# Patient Record
Sex: FEMALE | Race: WHITE | NOT HISPANIC OR LATINO | Employment: UNEMPLOYED | URBAN - METROPOLITAN AREA
[De-identification: names, ages, dates, MRNs, and addresses within clinical notes are randomized per-mention and may not be internally consistent; named-entity substitution may affect disease eponyms.]

---

## 2017-06-26 ENCOUNTER — OFFICE VISIT (OUTPATIENT)
Dept: FAMILY MEDICINE CLINIC | Facility: CLINIC | Age: 37
End: 2017-06-26

## 2017-06-26 VITALS
OXYGEN SATURATION: 98 % | HEIGHT: 70 IN | TEMPERATURE: 98.4 F | HEART RATE: 80 BPM | SYSTOLIC BLOOD PRESSURE: 124 MMHG | DIASTOLIC BLOOD PRESSURE: 80 MMHG | WEIGHT: 198.4 LBS | BODY MASS INDEX: 28.4 KG/M2

## 2017-06-26 DIAGNOSIS — M79.7 FIBROMYALGIA: Primary | ICD-10-CM

## 2017-06-26 PROCEDURE — 99213 OFFICE O/P EST LOW 20 MIN: CPT | Performed by: FAMILY MEDICINE

## 2017-06-26 RX ORDER — VENLAFAXINE HYDROCHLORIDE 75 MG/1
75 CAPSULE, EXTENDED RELEASE ORAL DAILY
Qty: 30 CAPSULE | Refills: 5 | Status: SHIPPED | OUTPATIENT
Start: 2017-06-26 | End: 2017-09-29 | Stop reason: SDUPTHER

## 2017-06-26 RX ORDER — MAGNESIUM GLUCONATE 27 MG(500)
500 TABLET ORAL DAILY
COMMUNITY

## 2017-06-26 RX ORDER — VENLAFAXINE HYDROCHLORIDE 37.5 MG/1
37.5 CAPSULE, EXTENDED RELEASE ORAL DAILY
Qty: 14 CAPSULE | Refills: 0 | Status: SHIPPED | OUTPATIENT
Start: 2017-06-26 | End: 2017-07-24

## 2017-06-26 RX ORDER — AMOXICILLIN 250 MG/1
CAPSULE ORAL
Refills: 0 | COMMUNITY
Start: 2017-06-21 | End: 2017-07-24

## 2017-07-24 ENCOUNTER — OFFICE VISIT (OUTPATIENT)
Dept: FAMILY MEDICINE CLINIC | Facility: CLINIC | Age: 37
End: 2017-07-24

## 2017-07-24 VITALS
BODY MASS INDEX: 27.47 KG/M2 | TEMPERATURE: 98.7 F | WEIGHT: 191.9 LBS | OXYGEN SATURATION: 96 % | HEIGHT: 70 IN | DIASTOLIC BLOOD PRESSURE: 90 MMHG | HEART RATE: 97 BPM | SYSTOLIC BLOOD PRESSURE: 126 MMHG

## 2017-07-24 DIAGNOSIS — M79.7 FIBROMYALGIA: Primary | ICD-10-CM

## 2017-07-24 PROCEDURE — 99213 OFFICE O/P EST LOW 20 MIN: CPT | Performed by: FAMILY MEDICINE

## 2017-07-24 NOTE — PROGRESS NOTES
Subjective   Fidelia Avendaño is a 37 y.o. female who is here for   Chief Complaint   Patient presents with   • Anxiety   • Depression   .     History of Present Illness   Fibromyalgia: Patient here for follow up on fibromyalgia. Patient has a several years history of fatigue: moderate: course over time: gradually improving.  depression: moderate: course over time: symptoms have progressed to a point and plateaued..  Onset was gradual. The symptoms are of moderate severity. They are made worse by: nothing. They are helped by rest. The patient denies diagnosis is of longstanding, no ROS questions asked. Previous treatments include prescription meds - see below. Associated symptoms include none. Patient denies associated rashes/photosensitive. Evaluation to date includes diagnosis is of longstanding, no recent workup.. Recent interventions include adding effexor.  Limitation on activities include difficulty with ADLs - some. Patient a SAHM.  Overall happy with effexors effects  Less fatigue        The following portions of the patient's history were reviewed and updated as appropriate: allergies, current medications, past family history, past medical history, past social history, past surgical history and problem list.    Review of Systems    Objective   Physical Exam   Constitutional: She appears well-developed and well-nourished.   Cardiovascular: Normal rate.    Pulmonary/Chest: Effort normal.   Musculoskeletal: Normal range of motion.   Neurological: She is alert.   Psychiatric: She has a normal mood and affect.   Nursing note and vitals reviewed.      Assessment/Plan   Fidelia was seen today for anxiety and depression.    Diagnoses and all orders for this visit:    Fibromyalgia      Patient Instructions   Stay on Effexor 75      Medications Discontinued During This Encounter   Medication Reason   • amoxicillin (AMOXIL) 250 MG capsule    • venlafaxine XR (EFFEXOR XR) 37.5 MG 24 hr capsule         Return for Next  scheduled follow up.    Dr. Antonio Ann  [place], [place]

## 2017-09-20 DIAGNOSIS — Z00.00 ROUTINE ADULT HEALTH MAINTENANCE: Primary | ICD-10-CM

## 2017-09-21 LAB
ALBUMIN SERPL-MCNC: 4.6 G/DL (ref 3.5–5.2)
ALBUMIN/GLOB SERPL: 1.9 G/DL
ALP SERPL-CCNC: 63 U/L (ref 40–129)
ALT SERPL-CCNC: 63 U/L (ref 5–33)
APPEARANCE UR: CLEAR
AST SERPL-CCNC: 39 U/L (ref 5–32)
BACTERIA #/AREA URNS HPF: ABNORMAL /HPF
BILIRUB SERPL-MCNC: 0.7 MG/DL (ref 0.2–1.2)
BILIRUB UR QL STRIP: NEGATIVE
BUN SERPL-MCNC: 12 MG/DL (ref 6–20)
BUN/CREAT SERPL: 12.8 (ref 7–25)
CALCIUM SERPL-MCNC: 9.6 MG/DL (ref 8.6–10.5)
CHLORIDE SERPL-SCNC: 102 MMOL/L (ref 98–107)
CHOLEST SERPL-MCNC: 132 MG/DL (ref 0–200)
CO2 SERPL-SCNC: 28.6 MMOL/L (ref 22–29)
COLOR UR: YELLOW
CREAT SERPL-MCNC: 0.94 MG/DL (ref 0.57–1)
EPI CELLS #/AREA URNS HPF: ABNORMAL /HPF
ERYTHROCYTE [DISTWIDTH] IN BLOOD BY AUTOMATED COUNT: 12.7 % (ref 11.5–14.5)
GLOBULIN SER CALC-MCNC: 2.4 GM/DL
GLUCOSE SERPL-MCNC: 90 MG/DL (ref 65–99)
GLUCOSE UR QL: NEGATIVE
HCT VFR BLD AUTO: 42.8 % (ref 37–47)
HDLC SERPL-MCNC: 65 MG/DL (ref 40–60)
HGB BLD-MCNC: 14.3 G/DL (ref 12–16)
HGB UR QL STRIP: NEGATIVE
KETONES UR QL STRIP: NEGATIVE
LDLC SERPL CALC-MCNC: 54 MG/DL (ref 0–100)
LDLC/HDLC SERPL: 0.83 {RATIO}
LEUKOCYTE ESTERASE UR QL STRIP: ABNORMAL
MCH RBC QN AUTO: 31.5 PG (ref 27–31)
MCHC RBC AUTO-ENTMCNC: 33.4 G/DL (ref 31–37)
MCV RBC AUTO: 94.3 FL (ref 81–99)
NITRITE UR QL STRIP: NEGATIVE
PH UR STRIP: 5.5 [PH] (ref 4.5–8)
PLATELET # BLD AUTO: 311 10*3/MM3 (ref 140–500)
POTASSIUM SERPL-SCNC: 4.7 MMOL/L (ref 3.5–5.2)
PROT SERPL-MCNC: 7 G/DL (ref 6–8.5)
PROT UR QL STRIP: NEGATIVE
RBC # BLD AUTO: 4.54 10*6/MM3 (ref 4.2–5.4)
RBC #/AREA URNS HPF: ABNORMAL /HPF
SODIUM SERPL-SCNC: 141 MMOL/L (ref 136–145)
SP GR UR: 1.02 (ref 1–1.03)
TRIGL SERPL-MCNC: 64 MG/DL (ref 0–150)
UROBILINOGEN UR STRIP-MCNC: ABNORMAL MG/DL
VLDLC SERPL CALC-MCNC: 12.8 MG/DL (ref 7–27)
WBC # BLD AUTO: 5.4 10*3/MM3 (ref 4.8–10.8)
WBC #/AREA URNS HPF: ABNORMAL /HPF

## 2017-09-29 ENCOUNTER — OFFICE VISIT (OUTPATIENT)
Dept: FAMILY MEDICINE CLINIC | Facility: CLINIC | Age: 37
End: 2017-09-29

## 2017-09-29 VITALS
DIASTOLIC BLOOD PRESSURE: 88 MMHG | HEART RATE: 77 BPM | BODY MASS INDEX: 27.14 KG/M2 | HEIGHT: 70 IN | SYSTOLIC BLOOD PRESSURE: 120 MMHG | TEMPERATURE: 98 F | OXYGEN SATURATION: 98 % | WEIGHT: 189.6 LBS

## 2017-09-29 DIAGNOSIS — Z00.00 ROUTINE ADULT HEALTH MAINTENANCE: Primary | ICD-10-CM

## 2017-09-29 DIAGNOSIS — M79.7 FIBROMYALGIA: ICD-10-CM

## 2017-09-29 DIAGNOSIS — F33.0 MILD EPISODE OF RECURRENT MAJOR DEPRESSIVE DISORDER (HCC): ICD-10-CM

## 2017-09-29 DIAGNOSIS — R74.8 ELEVATED LIVER ENZYMES: ICD-10-CM

## 2017-09-29 PROCEDURE — 99395 PREV VISIT EST AGE 18-39: CPT | Performed by: FAMILY MEDICINE

## 2017-09-29 RX ORDER — VENLAFAXINE HYDROCHLORIDE 150 MG/1
150 CAPSULE, EXTENDED RELEASE ORAL DAILY
Qty: 30 CAPSULE | Refills: 5 | Status: SHIPPED | OUTPATIENT
Start: 2017-09-29 | End: 2018-04-04 | Stop reason: SDUPTHER

## 2017-09-29 NOTE — PROGRESS NOTES
"  Chief Complaint   Patient presents with   • Annual Exam   • Elevated Hepatic Enzymes       Subjective   Fidelia Avendaño is a 37 y.o. female and is here for a yearly physical exam. The patient reports problems - uncontrolled fibromyalagia, and new onset eleveated LFT's.    fibromyalgia is not much better, in bed most of the day  No SE from the 75 of Effexor, so lets double the dose.    Do you take any herbs or supplements that were not prescribed by a doctor? yes. If so, these will be added to active medication list.    The following portions of the patient's history were reviewed and updated as appropriate: allergies, current medications, past family history, past medical history, past social history, past surgical history and problem list.    Social and Family and Surgical History reviewed and updated today, see Rooming tab.    Health History, Preventive Measures and Vaccination flow sheets reviewed and updated today.    Patient's current medical chart in Epic; including previous office notes, imaging, labs, specialist's evaluation either in notes or in Media tab reviewed today.    Other pertinent medical information also reviewed thru Care Everywhere function is also reviewed today.    Review of Systems  Review of Systems  A comprehensive review of systems was negative except for: Constitutional: positive for fatigue and malaise  Behavioral/Psych: positive for depression and mood swings    Vitals:    09/29/17 0759   BP: 120/88   BP Location: Left arm   Patient Position: Sitting   Pulse: 77   Temp: 98 °F (36.7 °C)   SpO2: 98%   Weight: 189 lb 9.6 oz (86 kg)   Height: 70.25\" (178.4 cm)       General Appearance:  Alert, cooperative, no distress, appears stated age   Head:  Normocephalic, without obvious abnormality, atraumatic   Eyes:  PERRL, conjunctiva/corneas clear, EOM's intact.   Ears:  Normal TM's and external ear canals, both ears   Nose: Nares normal, septum midline, mucosa normal, no drainage or sinus " tenderness   Throat: Lips, mucosa, and tongue normal; teeth and gums normal   Neck: Supple, symmetrical, trachea midline, no adenopathy;   thyroid: No enlargement/tenderness/nodules; no carotid  bruit   Back:  Symmetric, no curvature, ROM normal, no CVA tenderness   Lungs:  Clear to auscultation bilaterally, respirations unlabored   Chest wall:  No tenderness or deformity   Heart:  Regular rate and rhythm, S1 and S2 normal, no murmur, rub or gallop   Abdomen:  Soft, non-tender, bowel sounds active all four quadrants,   no masses, no organomegaly   Rectal:        Extremities: Extremities normal, atraumatic, no cyanosis or edema   Pulses: 2+ and symmetric all extremities   Skin: Skin color, texture, turgor normal, no rashes or lesions   Lymph nodes: Cervical, supraclavicular, and axillary nodes normal   Neurologic: CNII-XII intact. Normal strength, sensation and reflexes   throughout          Results for orders placed or performed in visit on 09/20/17   Comprehensive metabolic panel   Result Value Ref Range    Glucose 90 65 - 99 mg/dL    BUN 12 6 - 20 mg/dL    Creatinine 0.94 0.57 - 1.00 mg/dL    eGFR Non African Am 67 >60 mL/min/1.73    eGFR African Am 81 >60 mL/min/1.73    BUN/Creatinine Ratio 12.8 7.0 - 25.0    Sodium 141 136 - 145 mmol/L    Potassium 4.7 3.5 - 5.2 mmol/L    Chloride 102 98 - 107 mmol/L    Total CO2 28.6 22.0 - 29.0 mmol/L    Calcium 9.6 8.6 - 10.5 mg/dL    Total Protein 7.0 6.0 - 8.5 g/dL    Albumin 4.60 3.50 - 5.20 g/dL    Globulin 2.4 gm/dL    A/G Ratio 1.9 g/dL    Total Bilirubin 0.7 0.2 - 1.2 mg/dL    Alkaline Phosphatase 63 40 - 129 U/L    AST (SGOT) 39 (H) 5 - 32 U/L    ALT (SGPT) 63 (H) 5 - 33 U/L   CBC (No Diff)   Result Value Ref Range    WBC 5.40 4.80 - 10.80 10*3/mm3    RBC 4.54 4.20 - 5.40 10*6/mm3    Hemoglobin 14.3 12.0 - 16.0 g/dL    Hematocrit 42.8 37.0 - 47.0 %    MCV 94.3 81.0 - 99.0 fL    MCH 31.5 (H) 27.0 - 31.0 pg    MCHC 33.4 31.0 - 37.0 g/dL    RDW 12.7 11.5 - 14.5 %     Platelets 311 140 - 500 10*3/mm3   Urinalysis With / Microscopic If Indicated   Result Value Ref Range    Specific Gravity, UA 1.020 1.003 - 1.030    pH, UA 5.5 4.5 - 8.0    Color, UA Yellow     Appearance, UA Clear Clear    Leukocytes, UA Trace (A) Negative    Protein Negative Negative    Glucose, UA Negative Negative    Ketones Negative     Blood, UA Negative Negative    Bilirubin, UA Negative Negative    Urobilinogen, UA Comment     Nitrite, UA Negative Negative   Lipid Panel With LDL / HDL Ratio   Result Value Ref Range    Total Cholesterol 132 0 - 200 mg/dL    Triglycerides 64 0 - 150 mg/dL    HDL Cholesterol 65 (H) 40 - 60 mg/dL    VLDL Cholesterol 12.8 7 - 27 mg/dL    LDL Cholesterol  54 0 - 100 mg/dL    LDL/HDL Ratio 0.83    Microscopic Examination   Result Value Ref Range    WBC, UA 6-12 (A) None Seen /hpf    RBC, UA 3-5 (A) None Seen /hpf    Epithelial Cells (non renal) 3-6 (A) /hpf    Bacteria, UA 1+ (A) None Seen /hpf     Assessment/Plan   Healthy female exam.  Fidelia was seen today for annual exam and elevated hepatic enzymes.    Diagnoses and all orders for this visit:    Routine adult health maintenance    Mild episode of recurrent major depressive disorder    Fibromyalgia  -     venlafaxine XR (EFFEXOR-XR) 150 MG 24 hr capsule; Take 1 capsule by mouth Daily.  -     Hepatic Function Panel  -     Amylase  -     Lipase  -     AYO With / DsDNA, RNP, Sjogrens A / B, Casillas; Future  -     Iron and TIBC  -     Ferritin  -     Cancel: Hepatic Function Panel; Future  -     US Liver; Future    Elevated liver enzymes  -     TSH  -     T4, Free      1. Will work up worsening elevated LFT's.  Working diagnosis is fatty liver.    2. Patient Counseling:  --Nutrition: Stressed importance of moderation in sodium/caffeine intake, saturated fat and cholesterol.  Discussed caloric balance, sufficient intake of fresh fruits, vegetables, fiber, calcium, iron.ok  --  Exercise: Stressed the importance   --of regular  exercise.   --Substance Abuse: Discussed cessation/primary prevention of tobacco, alcohol, or other drug use; driving or other dangerous activities under the influence. None here   --Dental health: Discussed importance of regular tooth brushing, flossing, and dental visits.  -- suggested having eyes and vision checked if needed or past due.      3. Discussed the patient's BMI with her.  The BMI is in the acceptable range  4. Follow up in 2 weeks    There are no Patient Instructions on file for this visit.    Medications Discontinued During This Encounter   Medication Reason   • venlafaxine XR (EFFEXOR XR) 75 MG 24 hr capsule Reorder        Dr. Antonio Ann MD  [place], [place]  [facility]

## 2017-10-02 LAB
ALBUMIN SERPL-MCNC: 4.6 G/DL (ref 3.5–5.2)
ALP SERPL-CCNC: 66 U/L (ref 40–129)
ALT SERPL-CCNC: 59 U/L (ref 5–33)
AMYLASE SERPL-CCNC: 35 U/L (ref 28–100)
ANA SER QL: NEGATIVE
AST SERPL-CCNC: 32 U/L (ref 5–32)
BILIRUB DIRECT SERPL-MCNC: <0.2 MG/DL (ref 0.2–0.3)
BILIRUB SERPL-MCNC: 0.4 MG/DL (ref 0.2–1.2)
FERRITIN SERPL-MCNC: 121.7 NG/ML (ref 13–150)
IRON SATN MFR SERPL: 28 %
IRON SERPL-MCNC: 82 MCG/DL (ref 37–145)
LIPASE SERPL-CCNC: 29 U/L (ref 13–60)
PROT SERPL-MCNC: 6.9 G/DL (ref 6–8.5)
T4 FREE SERPL-MCNC: 0.96 NG/DL (ref 0.93–1.7)
TIBC SERPL-MCNC: 298 MCG/DL (ref 261–478)
TSH SERPL DL<=0.005 MIU/L-ACNC: 2.85 MIU/ML (ref 0.27–4.2)
UIBC SERPL-MCNC: 216 MCG/DL (ref 112–346)

## 2017-10-05 ENCOUNTER — HOSPITAL ENCOUNTER (OUTPATIENT)
Dept: ULTRASOUND IMAGING | Facility: HOSPITAL | Age: 37
Discharge: HOME | End: 2017-10-05
Attending: FAMILY MEDICINE | Admitting: FAMILY MEDICINE

## 2017-10-05 DIAGNOSIS — M79.7 FIBROMYALGIA: ICD-10-CM

## 2017-10-05 PROCEDURE — 76705 ECHO EXAM OF ABDOMEN: CPT

## 2017-10-14 DIAGNOSIS — F32.A DEPRESSION: ICD-10-CM

## 2017-10-16 RX ORDER — ESCITALOPRAM OXALATE 10 MG/1
TABLET ORAL
Qty: 90 TABLET | Refills: 0 | Status: SHIPPED | OUTPATIENT
Start: 2017-10-16 | End: 2017-10-17 | Stop reason: SDUPTHER

## 2017-10-17 ENCOUNTER — OFFICE VISIT (OUTPATIENT)
Dept: FAMILY MEDICINE CLINIC | Facility: CLINIC | Age: 37
End: 2017-10-17

## 2017-10-17 VITALS
HEIGHT: 70 IN | OXYGEN SATURATION: 95 % | DIASTOLIC BLOOD PRESSURE: 80 MMHG | BODY MASS INDEX: 26.77 KG/M2 | WEIGHT: 187 LBS | HEART RATE: 97 BPM | SYSTOLIC BLOOD PRESSURE: 110 MMHG

## 2017-10-17 DIAGNOSIS — F33.1 MODERATE EPISODE OF RECURRENT MAJOR DEPRESSIVE DISORDER (HCC): ICD-10-CM

## 2017-10-17 DIAGNOSIS — R74.8 ELEVATED LIVER ENZYMES: Primary | ICD-10-CM

## 2017-10-17 PROCEDURE — 99213 OFFICE O/P EST LOW 20 MIN: CPT | Performed by: FAMILY MEDICINE

## 2017-10-17 RX ORDER — ESCITALOPRAM OXALATE 20 MG/1
20 TABLET ORAL EVERY MORNING
Qty: 30 TABLET | Refills: 5 | Status: SHIPPED | OUTPATIENT
Start: 2017-10-17 | End: 2018-06-16 | Stop reason: SDUPTHER

## 2017-10-17 NOTE — PATIENT INSTRUCTIONS
Results for orders placed or performed in visit on 09/29/17   Hepatic Function Panel   Result Value Ref Range    Total Protein 6.9 6.0 - 8.5 g/dL    Albumin 4.60 3.50 - 5.20 g/dL    Total Bilirubin 0.4 0.2 - 1.2 mg/dL    Bilirubin, Direct <0.2 (L) 0.2 - 0.3 mg/dL    Alkaline Phosphatase 66 40 - 129 U/L    AST (SGOT) 32 5 - 32 U/L    ALT (SGPT) 59 (H) 5 - 33 U/L   Amylase   Result Value Ref Range    Amylase 35 28 - 100 U/L   Lipase   Result Value Ref Range    Lipase 29 13 - 60 U/L   Iron and TIBC   Result Value Ref Range    TIBC 298 261 - 478 mcg/dL    UIBC 216 112 - 346 mcg/dL    Iron 82 37 - 145 mcg/dL    Iron Saturation 28 %   Ferritin   Result Value Ref Range    Ferritin 121.70 13.00 - 150.00 ng/mL   T4, Free   Result Value Ref Range    Free T4 0.96 0.93 - 1.70 ng/dL   TSH   Result Value Ref Range    TSH 2.850 0.270 - 4.200 mIU/mL   AYO With / DsDNA, RNP, Sjogrens A / B, Casillas   Result Value Ref Range    AYO Direct Negative Negative     Liver ultrasound      INDICATION: Worsening Abnormal elevated liver enzymes. Prior history of  cholecystectomy.      FINDINGS: Sonographic evaluation is performed of the right upper  quadrant in multiple planes. No comparison. Pancreas is normal. Liver  parenchyma is echogenic compatible with fatty infiltration. No focal  liver mass is seen. Right kidney is morphologically normal and  nonobstructed. Common duct is normal at less than 2 mm internal  diameter. Gallbladder surgically absent. No free fluid.      IMPRESSION:  Hepatic steatosis and cholecystectomy. Otherwise negative.      This report was finalized on 10/5/2017 9:21 AM by Dr. Rick Aragon MD.        Fatty liver mangagement is weight loss  Will recheck LFT's in a year.    Lets double lexapro for depression

## 2017-10-17 NOTE — PROGRESS NOTES
Subjective   Fidelia Avendaño is a 37 y.o. female who is here for   Chief Complaint   Patient presents with   • Follow-up     lab results    • Elevated Hepatic Enzymes   .     History of Present Illness   Reviewed liver tests for mildly elevated LFT's  Fatty liver is seen.    Still with lots of am fatigue.    Having sharp pains in right shoulder.  Previous small tears yrs ago, without preceding injury.    The following portions of the patient's history were reviewed and updated as appropriate: allergies, current medications, past family history, past medical history, past social history, past surgical history and problem list.    Review of Systems    Objective   Physical Exam   Constitutional: She appears well-developed and well-nourished.   Pulmonary/Chest: Effort normal.   Abdominal: There is no tenderness.   Musculoskeletal:        Right shoulder: Normal.   Skin: No rash noted.   Nursing note and vitals reviewed.      Assessment/Plan   Fidelia was seen today for follow-up and elevated hepatic enzymes.    Diagnoses and all orders for this visit:    Elevated liver enzymes    Moderate episode of recurrent major depressive disorder  -     escitalopram (LEXAPRO) 20 MG tablet; Take 1 tablet by mouth Every Morning.      Patient Instructions     Results for orders placed or performed in visit on 09/29/17   Hepatic Function Panel   Result Value Ref Range    Total Protein 6.9 6.0 - 8.5 g/dL    Albumin 4.60 3.50 - 5.20 g/dL    Total Bilirubin 0.4 0.2 - 1.2 mg/dL    Bilirubin, Direct <0.2 (L) 0.2 - 0.3 mg/dL    Alkaline Phosphatase 66 40 - 129 U/L    AST (SGOT) 32 5 - 32 U/L    ALT (SGPT) 59 (H) 5 - 33 U/L   Amylase   Result Value Ref Range    Amylase 35 28 - 100 U/L   Lipase   Result Value Ref Range    Lipase 29 13 - 60 U/L   Iron and TIBC   Result Value Ref Range    TIBC 298 261 - 478 mcg/dL    UIBC 216 112 - 346 mcg/dL    Iron 82 37 - 145 mcg/dL    Iron Saturation 28 %   Ferritin   Result Value Ref Range    Ferritin 121.70  13.00 - 150.00 ng/mL   T4, Free   Result Value Ref Range    Free T4 0.96 0.93 - 1.70 ng/dL   TSH   Result Value Ref Range    TSH 2.850 0.270 - 4.200 mIU/mL   AYO With / DsDNA, RNP, Sjogrens A / B, Casillas   Result Value Ref Range    AYO Direct Negative Negative     Liver ultrasound      INDICATION: Worsening Abnormal elevated liver enzymes. Prior history of  cholecystectomy.      FINDINGS: Sonographic evaluation is performed of the right upper  quadrant in multiple planes. No comparison. Pancreas is normal. Liver  parenchyma is echogenic compatible with fatty infiltration. No focal  liver mass is seen. Right kidney is morphologically normal and  nonobstructed. Common duct is normal at less than 2 mm internal  diameter. Gallbladder surgically absent. No free fluid.      IMPRESSION:  Hepatic steatosis and cholecystectomy. Otherwise negative.      This report was finalized on 10/5/2017 9:21 AM by Dr. Rick Aragon MD.        Fatty liver mangagement is weight loss  Will recheck LFT's in a year.    Lets double lexapro for depression      Medications Discontinued During This Encounter   Medication Reason   • escitalopram (LEXAPRO) 10 MG tablet Reorder        Return in about 1 year (around 10/17/2018).    Dr. Antonio Ann  [place], [place]

## 2018-04-04 DIAGNOSIS — M79.7 FIBROMYALGIA: ICD-10-CM

## 2018-04-04 RX ORDER — VENLAFAXINE HYDROCHLORIDE 150 MG/1
150 CAPSULE, EXTENDED RELEASE ORAL DAILY
Qty: 30 CAPSULE | Refills: 5 | Status: SHIPPED | OUTPATIENT
Start: 2018-04-04 | End: 2018-06-05

## 2018-04-18 ENCOUNTER — OFFICE VISIT (OUTPATIENT)
Dept: FAMILY MEDICINE CLINIC | Facility: CLINIC | Age: 38
End: 2018-04-18

## 2018-04-18 VITALS
TEMPERATURE: 98 F | SYSTOLIC BLOOD PRESSURE: 118 MMHG | OXYGEN SATURATION: 98 % | BODY MASS INDEX: 25.91 KG/M2 | DIASTOLIC BLOOD PRESSURE: 80 MMHG | HEART RATE: 89 BPM | HEIGHT: 70 IN | WEIGHT: 181 LBS

## 2018-04-18 DIAGNOSIS — M79.7 FIBROMYALGIA: Primary | ICD-10-CM

## 2018-04-18 PROCEDURE — 99213 OFFICE O/P EST LOW 20 MIN: CPT | Performed by: FAMILY MEDICINE

## 2018-04-18 NOTE — PROGRESS NOTES
Subjective   Fidelia Avendaño is a 37 y.o. female who is here for   Chief Complaint   Patient presents with   • Follow-up   • Fibromyalgia   • Fatigue   • Depression   • Headache   .     History of Present Illness   Fibromyalgia: Patient here for follow up on fibromyalgia. Patient has a 10 years history of soft tissue pain: b arm, back and b leg. severity: fairly severe, overall course: symptoms have progressed to a point and plateaued: course over time: symptoms have progressed to a point and plateaued.  fatigue: moderate: course over time: symptoms have progressed to a point and plateaued.  depression: moderate: course over time: symptoms have progressed to a point and plateaued.  headaches: moderate, tolerable: course over time: symptoms have progressed to a point and plateaued..  Onset was gradual. The symptoms are of moderate severity. They are made worse by: menstrual periods, overhead work and raising arm over head. They are helped by nothing. The patient denies diagnosis is of longstanding, no ROS questions asked. Previous treatments include lexapro, effexor, savella. Associated symptoms include fatigue, joint pain, muscle weakness and new headache. Patient denies associated bleeding/clotting problems, nodules, oral ulcers and rashes/photosensitive. Evaluation to date includes diagnosis is of longstanding, no recent workup.. Recent interventions include no recent changes in regimen..  Limitation on activities include difficulty with ADLs - x. Patient struggles as a SAHM.    Effexor has helped some  We discussed Savella , was on med in past , worked well for years then stopped  Willing to re try.      The following portions of the patient's history were reviewed and updated as appropriate: allergies, current medications, past family history, past medical history, past social history, past surgical history and problem list.    Review of Systems    Objective   Physical Exam   Constitutional: She appears  well-developed and well-nourished.   Cardiovascular: Normal rate.    Pulmonary/Chest: Effort normal.   Neurological: She is alert.   Psychiatric: She exhibits a depressed mood.   Nursing note and vitals reviewed.      Assessment/Plan   Fidelia was seen today for follow-up, fibromyalgia, fatigue, depression and headache.    Diagnoses and all orders for this visit:    Fibromyalgia  -     milnacipran (SAVELLA) 50 MG tablet tablet; Take 1 tablet by mouth 2 (Two) Times a Day.      Patient Instructions   Once we get the PA on Savella, then stop effexor        There are no discontinued medications.     Return in about 6 weeks (around 5/30/2018) for new medication follow up.    Dr. Antonio Ann  [place], [place]

## 2018-06-05 ENCOUNTER — OFFICE VISIT (OUTPATIENT)
Dept: FAMILY MEDICINE CLINIC | Facility: CLINIC | Age: 38
End: 2018-06-05

## 2018-06-05 VITALS
SYSTOLIC BLOOD PRESSURE: 120 MMHG | BODY MASS INDEX: 26.34 KG/M2 | OXYGEN SATURATION: 97 % | WEIGHT: 184 LBS | HEART RATE: 91 BPM | HEIGHT: 70 IN | DIASTOLIC BLOOD PRESSURE: 84 MMHG

## 2018-06-05 DIAGNOSIS — M79.7 FIBROMYALGIA: Primary | ICD-10-CM

## 2018-06-05 PROCEDURE — 99213 OFFICE O/P EST LOW 20 MIN: CPT | Performed by: FAMILY MEDICINE

## 2018-06-05 RX ORDER — IBUPROFEN 200 MG
200 TABLET ORAL EVERY 6 HOURS PRN
COMMUNITY

## 2018-06-05 RX ORDER — ACETAMINOPHEN 500 MG
500 TABLET ORAL EVERY 6 HOURS PRN
COMMUNITY

## 2018-06-05 NOTE — PROGRESS NOTES
Subjective   Fidelia Avendaño is a 38 y.o. female who is here for   Chief Complaint   Patient presents with   • Follow-up   • Depression   • Fibromyalgia   .     History of Present Illness   fibromyalgia may be some better  But only taking 1 Savella a day by mistake  Should be on bid  She can not read the labels.        The following portions of the patient's history were reviewed and updated as appropriate: allergies, current medications, past family history, past medical history, past social history, past surgical history and problem list.    Review of Systems   Constitutional: Positive for fatigue.   Cardiovascular: Positive for palpitations.   Musculoskeletal: Positive for myalgias and neck stiffness. Negative for joint swelling.   Skin: Negative for rash.       Objective   Physical Exam   Constitutional: She appears well-developed and well-nourished.   Cardiovascular: Normal rate.    Neurological: She is alert.   Nursing note and vitals reviewed.      Assessment/Plan   Fidelia was seen today for follow-up, depression and fibromyalgia.    Diagnoses and all orders for this visit:    Fibromyalgia      Patient Instructions   Increase Savella to BID        Medications Discontinued During This Encounter   Medication Reason   • venlafaxine XR (EFFEXOR-XR) 150 MG 24 hr capsule *Therapy completed        No Follow-up on file.    Dr. Antonio Ann  [facility]  [place], [place]

## 2018-06-16 DIAGNOSIS — F33.1 MODERATE EPISODE OF RECURRENT MAJOR DEPRESSIVE DISORDER (HCC): ICD-10-CM

## 2018-06-18 RX ORDER — ESCITALOPRAM OXALATE 20 MG/1
20 TABLET ORAL EVERY MORNING
Qty: 30 TABLET | Refills: 5 | Status: SHIPPED | OUTPATIENT
Start: 2018-06-18 | End: 2018-11-09 | Stop reason: SDUPTHER

## 2018-07-26 ENCOUNTER — TELEPHONE (OUTPATIENT)
Dept: FAMILY MEDICINE CLINIC | Facility: CLINIC | Age: 38
End: 2018-07-26

## 2018-07-26 DIAGNOSIS — M79.7 FIBROMYALGIA: Primary | ICD-10-CM

## 2018-07-26 NOTE — TELEPHONE ENCOUNTER
Fidelia said that she had spoke to you regarding increasing her Savella. She wants to know if you can call this in to [facility].    Ok , i sent in new higher dose of 100 mg bid  You are on the 50 bid now  Try the 50 mg in am and 100 at bed for 7 days to get used to new dose  Then day 8 , take the full 100 bid.    Antonio Ann MD

## 2018-08-07 ENCOUNTER — OFFICE VISIT (OUTPATIENT)
Dept: FAMILY MEDICINE CLINIC | Facility: CLINIC | Age: 38
End: 2018-08-07

## 2018-08-07 VITALS
DIASTOLIC BLOOD PRESSURE: 90 MMHG | BODY MASS INDEX: 27.06 KG/M2 | OXYGEN SATURATION: 98 % | SYSTOLIC BLOOD PRESSURE: 128 MMHG | HEIGHT: 70 IN | WEIGHT: 189 LBS | HEART RATE: 94 BPM

## 2018-08-07 DIAGNOSIS — M79.7 FIBROMYALGIA: Primary | ICD-10-CM

## 2018-08-07 PROCEDURE — 99213 OFFICE O/P EST LOW 20 MIN: CPT | Performed by: FAMILY MEDICINE

## 2018-11-09 DIAGNOSIS — F33.1 MODERATE EPISODE OF RECURRENT MAJOR DEPRESSIVE DISORDER (HCC): ICD-10-CM

## 2018-11-09 DIAGNOSIS — M79.7 FIBROMYALGIA: ICD-10-CM

## 2018-11-09 RX ORDER — ESCITALOPRAM OXALATE 20 MG/1
20 TABLET ORAL EVERY MORNING
Qty: 90 TABLET | Refills: 0 | Status: SHIPPED | OUTPATIENT
Start: 2018-11-09 | End: 2019-02-11 | Stop reason: SDUPTHER

## 2019-02-11 DIAGNOSIS — F33.1 MODERATE EPISODE OF RECURRENT MAJOR DEPRESSIVE DISORDER (HCC): ICD-10-CM

## 2019-02-11 DIAGNOSIS — M79.7 FIBROMYALGIA: ICD-10-CM

## 2019-02-12 RX ORDER — MILNACIPRAN HYDROCHLORIDE 100 MG/1
TABLET, FILM COATED ORAL
Qty: 180 TABLET | Refills: 0 | Status: SHIPPED | OUTPATIENT
Start: 2019-02-12 | End: 2019-05-02 | Stop reason: SDUPTHER

## 2019-02-12 RX ORDER — ESCITALOPRAM OXALATE 20 MG/1
TABLET ORAL
Qty: 90 TABLET | Refills: 0 | Status: SHIPPED | OUTPATIENT
Start: 2019-02-12 | End: 2019-05-02 | Stop reason: SDUPTHER

## 2019-02-18 ENCOUNTER — OFFICE VISIT (OUTPATIENT)
Dept: FAMILY MEDICINE CLINIC | Facility: CLINIC | Age: 39
End: 2019-02-18

## 2019-02-18 VITALS
WEIGHT: 194 LBS | BODY MASS INDEX: 27.77 KG/M2 | SYSTOLIC BLOOD PRESSURE: 130 MMHG | HEIGHT: 70 IN | DIASTOLIC BLOOD PRESSURE: 84 MMHG | OXYGEN SATURATION: 99 % | HEART RATE: 114 BPM

## 2019-02-18 DIAGNOSIS — M79.7 FIBROMYALGIA: Primary | ICD-10-CM

## 2019-02-18 PROCEDURE — 99213 OFFICE O/P EST LOW 20 MIN: CPT | Performed by: FAMILY MEDICINE

## 2019-02-18 NOTE — PROGRESS NOTES
Subjective   Fidelia Avendaño is a 38 y.o. female who is here for   Chief Complaint   Patient presents with   • Follow-up   • Fibromyalgia   .     History of Present Illness   Fibromyalgia: Patient here for follow up on fibromyalgia. Patient has a several year history of soft tissue pain: bilateral arm, back, head and neck. severity: moderate, tolerable: course over time: symptoms have progressed to a point and plateaued.  fatigue: fairly severe: course over time: symptoms have progressed to a point and plateaued.  headaches: tolerable: course over time: symptoms have progressed to a point and plateaued..  Onset was gradual. The symptoms are of moderate and generalized severity. They are made worse by: menstrual periods, movement, overhead work and overuse. They are helped by savella. The patient denies diagnosis is of longstanding, no ROS questions asked. Previous treatments include Massage Therapy. Associated symptoms include arthralgia, depression, fatigue, joint pain, morning stiffness and muscle weakness. Patient denies associated bleeding/clotting problems, new headache and oral ulcers. Evaluation to date includes diagnosis is of longstanding, no recent workup.. Recent interventions include no recent changes in regimen..  Limitation on activities include difficulty with ADLs - ,. Patient is on disability.  On max lexparo and max Savella  Headaches are some better  Having some fewer fatigue days      The following portions of the patient's history were reviewed and updated as appropriate: allergies, current medications, past family history, past medical history, past social history, past surgical history and problem list.    Review of Systems    Objective   Physical Exam   Constitutional: She appears well-developed and well-nourished.   Cardiovascular: Normal rate.   Pulmonary/Chest: Effort normal.   Musculoskeletal: She exhibits tenderness.   Nursing note and vitals reviewed.      Assessment/Plan   Fidelia proctor  seen today for follow-up and fibromyalgia.    Diagnoses and all orders for this visit:    Fibromyalgia  -     Ambulatory Referral to Physical Therapy Evaluate and treat; (dry needling/trigger point injections)      There are no Patient Instructions on file for this visit.    There are no discontinued medications.     Return in about 3 months (around 5/18/2019).    Dr. Antonio Ann  [place], [place]

## 2019-03-04 NOTE — PROGRESS NOTES
Physical Therapy Initial Evaluation and Plan of Care    Patient: Fidelia Avendaño   : 1980  Diagnosis/ICD-10 Code:  Fibromyalgia [M79.7]  Referring practitioner: Antonio Ann MD    Subjective Evaluation    History of Present Illness  Mechanism of injury: Pt has suffered with fibromyalgia throughout body and her MD suggested dry needling.  Pt reports that she tried massage therapy in the past and that she had a significant increase in pain over the next 4 days.  Her MD suggested that she just start with one body area and she would like to have her low back treated today.       PMH of Raynaud's, is legally blind and     Pain  Current pain ratin  At worst pain ratin  Location: LLBP and hips   Quality: discomfort and dull ache  Relieving factors: medications (Savella)    Treatments  Previous treatment: massage  Patient Goals  Patient goals for therapy: decreased pain             Objective       Palpation   Left   Tenderness of the erector spinae, gluteus maico, lumbar paraspinals and piriformis.     Right Tenderness of the gluteus maico and piriformis.          Assessment/Plan  Informed and signed consent for Dry Needling obtained. Patient educated in purpose, risk factors and procedure. Due to poor response to massage therapy in the past, fewer needles were used.  Pt reported mild soreness following treatment.  Requested patient to apply moist heat again tonight.      Cont PT 1x per week or as indicated by patient for Dry Needling.  Pt may benefit from additional needles as tolerated depending upon response.      Manual Therapy:         mins  74152;  Therapeutic Exercise:         mins  20839;     Neuromuscular Pelon:        mins  25606;    Therapeutic Activity:          mins  68905;     Gait Training:           mins  39027;     Ultrasound:          mins  96070;    Electrical Stimulation:         mins  97801 ( );  Iontophoresis                      mins 44000  Dry Needling                  15      mins self-pay      Timed Treatment:      mins   Total Treatment:     30   mins    PT SIGNATURE: Shaila Maradiaga, PT   [place] License # [id number]  DATE TREATMENT INITIATED: 3/6/2019    Initial Certification  Certification Period: 6/4/2019  I certify that the therapy services are furnished while this patient is under my care.  The services outlined above are required by this patient, and will be reviewed every 90 days.     PHYSICIAN: Antonio Ann MD      DATE:     Please sign and return via fax to [phone].. Thank you, [facility].

## 2019-03-06 ENCOUNTER — TREATMENT (OUTPATIENT)
Dept: PHYSICAL THERAPY | Facility: CLINIC | Age: 39
End: 2019-03-06

## 2019-03-06 DIAGNOSIS — M79.7 FIBROMYALGIA: Primary | ICD-10-CM

## 2019-03-06 PROCEDURE — DRYNDL PR CUSTOM DRY NEEDLING SELF PAY: Performed by: PHYSICAL THERAPIST

## 2019-03-21 ENCOUNTER — TREATMENT (OUTPATIENT)
Dept: PHYSICAL THERAPY | Facility: CLINIC | Age: 39
End: 2019-03-21

## 2019-03-21 DIAGNOSIS — M54.2 PAIN, NECK: ICD-10-CM

## 2019-03-21 DIAGNOSIS — M79.7 FIBROMYALGIA: Primary | ICD-10-CM

## 2019-03-21 PROCEDURE — DRYNDL PR CUSTOM DRY NEEDLING SELF PAY: Performed by: PHYSICAL THERAPIST

## 2019-03-21 NOTE — PROGRESS NOTES
Physical Therapy Daily Progress Note        Visit # : 2  Fidelia Avendaño reports: I have fibromyalgia and my neck has really been hurting me with increased headaches    Subjective     Objective       Palpation   Left   Hypertonic in the cervical interspinals, cervical paraspinals, levator scapulae, middle trapezius, sternocleidomastoid, suboccipitals and upper trapezius.     Right   Hypertonic in the cervical interspinals, cervical paraspinals, levator scapulae, middle trapezius, sternocleidomastoid, suboccipitals and upper trapezius.      See Exercise, Manual, and Modality Logs for complete treatment.       Assessment & Plan     Assessment  Assessment details: Patient presents with guarded cervical mobility and increased bilateral cervical muscle tone. Improved subjective reports and mobility after Dry Needling. Educated patient to apply moist heat again tonight. Cont PT 1x per week as indicated for Dry Needling.         Progress per Plan of Care           Manual Therapy:         mins  15511;  Therapeutic Exercise:         mins  15987;     Neuromuscular Pelon:       mins  06997;    Therapeutic Activity:          mins  44067;     Gait Training:           mins  79091;     Ultrasound:          mins  11647;    Electrical Stimulation:         mins  31322 ( );  Dry Needling     15     mins self-pay    Timed Treatment:      mins   Total Treatment:     25   mins    Madeleine Lopez, PT  Physical Therapist  [place] License # [id number]

## 2019-04-04 ENCOUNTER — TREATMENT (OUTPATIENT)
Dept: PHYSICAL THERAPY | Facility: CLINIC | Age: 39
End: 2019-04-04

## 2019-04-04 DIAGNOSIS — M79.7 FIBROMYALGIA: Primary | ICD-10-CM

## 2019-04-04 DIAGNOSIS — G89.29 CHRONIC BILATERAL LOW BACK PAIN WITHOUT SCIATICA: ICD-10-CM

## 2019-04-04 DIAGNOSIS — M54.50 CHRONIC BILATERAL LOW BACK PAIN WITHOUT SCIATICA: ICD-10-CM

## 2019-04-04 PROCEDURE — DRYNDL PR CUSTOM DRY NEEDLING SELF PAY: Performed by: PHYSICAL THERAPIST

## 2019-05-02 DIAGNOSIS — M79.7 FIBROMYALGIA: ICD-10-CM

## 2019-05-02 DIAGNOSIS — F33.1 MODERATE EPISODE OF RECURRENT MAJOR DEPRESSIVE DISORDER (HCC): ICD-10-CM

## 2019-05-02 RX ORDER — ESCITALOPRAM OXALATE 20 MG/1
TABLET ORAL
Qty: 90 TABLET | Refills: 0 | Status: SHIPPED | OUTPATIENT
Start: 2019-05-02 | End: 2019-07-18 | Stop reason: SDUPTHER

## 2019-05-02 RX ORDER — MILNACIPRAN HYDROCHLORIDE 100 MG/1
TABLET, FILM COATED ORAL
Qty: 180 TABLET | Refills: 0 | Status: SHIPPED | OUTPATIENT
Start: 2019-05-02 | End: 2019-07-18 | Stop reason: SDUPTHER

## 2019-06-03 ENCOUNTER — OFFICE VISIT (OUTPATIENT)
Dept: FAMILY MEDICINE CLINIC | Facility: CLINIC | Age: 39
End: 2019-06-03

## 2019-06-03 VITALS
OXYGEN SATURATION: 99 % | SYSTOLIC BLOOD PRESSURE: 144 MMHG | HEART RATE: 98 BPM | WEIGHT: 197.1 LBS | BODY MASS INDEX: 28.22 KG/M2 | DIASTOLIC BLOOD PRESSURE: 104 MMHG | HEIGHT: 70 IN

## 2019-06-03 DIAGNOSIS — M79.7 FIBROMYALGIA: Primary | ICD-10-CM

## 2019-06-03 PROCEDURE — 99213 OFFICE O/P EST LOW 20 MIN: CPT | Performed by: FAMILY MEDICINE

## 2019-06-03 NOTE — PROGRESS NOTES
Subjective   Fidelia Avendaño is a 39 y.o. female who is here for   Chief Complaint   Patient presents with   • Fibromyalgia   .     History of Present Illness   fibromyalgia is some better  Dry needling at PT helped for 24h per session  Mood is better      The following portions of the patient's history were reviewed and updated as appropriate: allergies, current medications, past family history, past medical history, past social history, past surgical history and problem list.    Review of Systems    Objective   Physical Exam   Constitutional: She appears well-developed and well-nourished.   Cardiovascular: Normal rate.   Neurological: She is alert.   Nursing note and vitals reviewed.      Assessment/Plan   Fidelia was seen today for fibromyalgia.    Diagnoses and all orders for this visit:    Fibromyalgia    stay on Savella and Lexparo  Ok to stop PT  There are no Patient Instructions on file for this visit.    There are no discontinued medications.     Return in about 4 months (around 10/3/2019).    Dr. Antonio Ann  [place], [place].

## 2019-07-18 DIAGNOSIS — M79.7 FIBROMYALGIA: ICD-10-CM

## 2019-07-18 DIAGNOSIS — F33.1 MODERATE EPISODE OF RECURRENT MAJOR DEPRESSIVE DISORDER (HCC): ICD-10-CM

## 2019-07-18 RX ORDER — ESCITALOPRAM OXALATE 20 MG/1
TABLET ORAL
Qty: 90 TABLET | Refills: 0 | Status: SHIPPED | OUTPATIENT
Start: 2019-07-18 | End: 2019-10-02 | Stop reason: SDUPTHER

## 2019-07-18 RX ORDER — MILNACIPRAN HYDROCHLORIDE 100 MG/1
TABLET, FILM COATED ORAL
Qty: 180 TABLET | Refills: 0 | Status: SHIPPED | OUTPATIENT
Start: 2019-07-18 | End: 2019-10-02 | Stop reason: SDUPTHER

## 2019-09-16 ENCOUNTER — TELEPHONE (OUTPATIENT)
Dept: FAMILY MEDICINE CLINIC | Facility: CLINIC | Age: 39
End: 2019-09-16

## 2019-09-16 DIAGNOSIS — F45.8: Primary | ICD-10-CM

## 2019-09-16 NOTE — TELEPHONE ENCOUNTER
BRIAN WITH [facility] [place], [address] , [address], [place]  [address]    PT WAS REFERRED BY [facility]OM[facility] ZAYNAB COTO     FOR STARGARTS SYNDROME    HAS AN APPT 9-19-19    NEEDS A  REFERRAL     (PT HAS NOT CALLED TO REQUEST REFERRAL)     Ok referral made    Antonio Ann MD

## 2019-10-02 DIAGNOSIS — F33.1 MODERATE EPISODE OF RECURRENT MAJOR DEPRESSIVE DISORDER (HCC): ICD-10-CM

## 2019-10-02 DIAGNOSIS — M79.7 FIBROMYALGIA: ICD-10-CM

## 2019-10-03 RX ORDER — ESCITALOPRAM OXALATE 20 MG/1
TABLET ORAL
Qty: 90 TABLET | Refills: 0 | Status: SHIPPED | OUTPATIENT
Start: 2019-10-03 | End: 2020-01-14

## 2019-10-03 RX ORDER — MILNACIPRAN HYDROCHLORIDE 100 MG/1
TABLET, FILM COATED ORAL
Qty: 180 TABLET | Refills: 0 | Status: SHIPPED | OUTPATIENT
Start: 2019-10-03 | End: 2020-01-14

## 2019-10-14 ENCOUNTER — OFFICE VISIT (OUTPATIENT)
Dept: FAMILY MEDICINE CLINIC | Facility: CLINIC | Age: 39
End: 2019-10-14

## 2019-10-14 VITALS
SYSTOLIC BLOOD PRESSURE: 128 MMHG | BODY MASS INDEX: 28.06 KG/M2 | HEIGHT: 70 IN | DIASTOLIC BLOOD PRESSURE: 86 MMHG | HEART RATE: 96 BPM | WEIGHT: 196 LBS | OXYGEN SATURATION: 97 %

## 2019-10-14 DIAGNOSIS — M79.7 FIBROMYALGIA: Primary | ICD-10-CM

## 2019-10-14 PROCEDURE — 99213 OFFICE O/P EST LOW 20 MIN: CPT | Performed by: FAMILY MEDICINE

## 2019-10-14 RX ORDER — CYCLOBENZAPRINE HCL 10 MG
10 TABLET ORAL NIGHTLY PRN
Qty: 30 TABLET | Refills: 0 | Status: SHIPPED | OUTPATIENT
Start: 2019-10-14 | End: 2019-11-30 | Stop reason: SDUPTHER

## 2019-10-14 NOTE — PROGRESS NOTES
Subjective   Fidelia Avendaño is a 39 y.o. female who is here for   Chief Complaint   Patient presents with   • Fatigue     exhausted all the time   • Depression     saddness    .     History of Present Illness   Depression is worse  Fatigue is worse  Body aches are worse  Sleep is worse    Flexeril has helped in past for pain and sleep.    Headaches are ok      The following portions of the patient's history were reviewed and updated as appropriate: allergies, current medications, past family history, past medical history, past social history, past surgical history and problem list.    Review of Systems    Objective   Physical Exam   Constitutional: She appears well-developed and well-nourished.   Cardiovascular: Normal rate.   Pulmonary/Chest: Effort normal.   Psychiatric: She has a normal mood and affect.   Nursing note and vitals reviewed.      Assessment/Plan   Fidelia was seen today for fatigue and depression.    Diagnoses and all orders for this visit:    Fibromyalgia  -     cyclobenzaprine (FLEXERIL) 10 MG tablet; Take 1 tablet by mouth At Night As Needed for Muscle Spasms.    will try flexeril first  May try ritalin next for chronic fatigue  Or maybe Trintillex?    There are no Patient Instructions on file for this visit.    Medications Discontinued During This Encounter   Medication Reason   • Multiple Vitamins-Minerals (MULTIVITAL PO) *Therapy completed        Return in about 3 weeks (around 11/4/2019) for new medication follow up.    Dr. Antonio Ann  [place], [place].

## 2019-10-30 ENCOUNTER — OFFICE VISIT (OUTPATIENT)
Dept: FAMILY MEDICINE CLINIC | Facility: CLINIC | Age: 39
End: 2019-10-30

## 2019-10-30 VITALS
DIASTOLIC BLOOD PRESSURE: 82 MMHG | WEIGHT: 192 LBS | SYSTOLIC BLOOD PRESSURE: 126 MMHG | HEART RATE: 99 BPM | HEIGHT: 70 IN | BODY MASS INDEX: 27.49 KG/M2 | OXYGEN SATURATION: 98 %

## 2019-10-30 DIAGNOSIS — R53.82 CHRONIC FATIGUE: Primary | ICD-10-CM

## 2019-10-30 PROCEDURE — 99213 OFFICE O/P EST LOW 20 MIN: CPT | Performed by: FAMILY MEDICINE

## 2019-10-30 RX ORDER — METHYLPHENIDATE HYDROCHLORIDE 10 MG/1
10 TABLET ORAL 2 TIMES DAILY
Qty: 60 TABLET | Refills: 0 | Status: SHIPPED | OUTPATIENT
Start: 2019-10-30 | End: 2019-11-27 | Stop reason: SDUPTHER

## 2019-10-30 NOTE — PROGRESS NOTES
Subjective   Fidelia Avendaño is a 39 y.o. female who is here for   Chief Complaint   Patient presents with   • Fibromyalgia     f/u    .     History of Present Illness   Flexeril helped with night time pain and sleep    But still feels like sleeping and staying in bed most of the time  The simplest activity in the house wears her out.      The following portions of the patient's history were reviewed and updated as appropriate: allergies, current medications, past family history, past medical history, past social history, past surgical history and problem list.    Review of Systems    Objective   Physical Exam   Constitutional: She appears well-developed and well-nourished.   Cardiovascular: Normal rate.   Pulmonary/Chest: Effort normal.   Neurological: She is alert.   Psychiatric: She exhibits a depressed mood.   Nursing note and vitals reviewed.      Assessment/Plan   Fidelia was seen today for fibromyalgia.    Diagnoses and all orders for this visit:    Chronic fatigue  -     methylphenidate (RITALIN) 10 MG tablet; Take 1 tablet by mouth 2 (Two) Times a Day.    I also filled out a lengthy   form for her    There are no Patient Instructions on file for this visit.    There are no discontinued medications.     Return in about 1 month (around 11/30/2019) for new medication follow up.    Dr. Antonio Ann  [facility] [place], [place].

## 2019-11-27 ENCOUNTER — OFFICE VISIT (OUTPATIENT)
Dept: FAMILY MEDICINE CLINIC | Facility: CLINIC | Age: 39
End: 2019-11-27

## 2019-11-27 VITALS
WEIGHT: 193 LBS | SYSTOLIC BLOOD PRESSURE: 128 MMHG | BODY MASS INDEX: 27.63 KG/M2 | DIASTOLIC BLOOD PRESSURE: 82 MMHG | OXYGEN SATURATION: 98 % | HEART RATE: 96 BPM | HEIGHT: 70 IN

## 2019-11-27 DIAGNOSIS — M79.7 FIBROMYALGIA: ICD-10-CM

## 2019-11-27 DIAGNOSIS — R53.82 CHRONIC FATIGUE: Primary | ICD-10-CM

## 2019-11-27 PROCEDURE — 99213 OFFICE O/P EST LOW 20 MIN: CPT | Performed by: FAMILY MEDICINE

## 2019-11-27 RX ORDER — METHYLPHENIDATE HYDROCHLORIDE 10 MG/1
10 TABLET ORAL 2 TIMES DAILY
Qty: 60 TABLET | Refills: 0 | Status: SHIPPED | OUTPATIENT
Start: 2019-11-27 | End: 2020-01-28 | Stop reason: SDUPTHER

## 2019-11-27 RX ORDER — METHYLPHENIDATE HYDROCHLORIDE 10 MG/1
10 TABLET ORAL 2 TIMES DAILY
Qty: 60 TABLET | Refills: 0 | Status: SHIPPED | OUTPATIENT
Start: 2019-11-27 | End: 2019-11-27 | Stop reason: SDUPTHER

## 2019-11-27 NOTE — PROGRESS NOTES
Subjective   Fidelia Avendaño is a 39 y.o. female who is here for   Chief Complaint   Patient presents with   • Fatigue     ritalin f/u    .     History of Present Illness   Feels much better with the ritalin in the am  Is awake  Able to cook breakfast for kids  Can do house chores.  Fatigue is better    The following portions of the patient's history were reviewed and updated as appropriate: allergies, current medications, past family history, past medical history, past social history, past surgical history and problem list.    Review of Systems    Objective   Physical Exam   Constitutional: She appears well-developed and well-nourished.   Cardiovascular: Normal rate.   Psychiatric: She has a normal mood and affect.   Nursing note and vitals reviewed.      Assessment/Plan   Fidelia was seen today for fatigue.    Diagnoses and all orders for this visit:    Chronic fatigue  -     Discontinue: methylphenidate (RITALIN) 10 MG tablet; Take 1 tablet by mouth 2 (Two) Times a Day.  -     methylphenidate (RITALIN) 10 MG tablet; Take 1 tablet by mouth 2 (Two) Times a Day.    Fibromyalgia    i printed 2 ritalin    Patient has been prescribed controlled medications.  Treatment discussed  MIKAYLA updated and reviewed.  Risk and Benefits discussed.  Per [place].      There are no Patient Instructions on file for this visit.    Medications Discontinued During This Encounter   Medication Reason   • Aspirin-Acetaminophen-Caffeine (EXCEDRIN PO) *Therapy completed   • methylphenidate (RITALIN) 10 MG tablet Reorder   • methylphenidate (RITALIN) 10 MG tablet Reorder        Return in about 2 months (around 1/27/2020) for new medication follow up.    Dr. Antonio Ann  [place], [place]

## 2019-11-30 DIAGNOSIS — M79.7 FIBROMYALGIA: ICD-10-CM

## 2019-12-02 RX ORDER — CYCLOBENZAPRINE HCL 10 MG
10 TABLET ORAL NIGHTLY PRN
Qty: 30 TABLET | Refills: 0 | Status: SHIPPED | OUTPATIENT
Start: 2019-12-02 | End: 2020-01-28 | Stop reason: SDUPTHER

## 2019-12-19 NOTE — PROGRESS NOTES
Physical Therapy Daily Progress Note        Visit # : 3  Fidelia Avendaño reports: I think my back is hurting worse today     Subjective     Objective       Palpation   Left   Hypertonic in the gluteus maico, lumbar interspinals, lumbar paraspinals and piriformis.     Right   Hypertonic in the gluteus maico, lumbar interspinals, lumbar paraspinals and piriformis.      See Exercise, Manual, and Modality Logs for complete treatment.       Assessment & Plan     Assessment  Assessment details: Patient presents with increased subjective reports and muscle tightness in bilateral low back today. Tolerated Dry Needling to bilateral low back. Improved muscle tone and mobility afterwards. Cont PT 1x per week or as indicated by patient.         Progress per Plan of Care           Manual Therapy:         mins  78644;  Therapeutic Exercise:         mins  13251;     Neuromuscular Pelon:       mins  52182;    Therapeutic Activity:          mins  46544;     Gait Training:           mins  85386;     Ultrasound:          mins  92530;    Electrical Stimulation:         mins  11491 ( );  Dry Needling     15     mins self-pay    Timed Treatment:      mins   Total Treatment:     25   mins    Madeleine Lopez, PT  Physical Therapist  [place] License # [id number]   without sedation

## 2019-12-24 ENCOUNTER — OFFICE VISIT (OUTPATIENT)
Dept: FAMILY MEDICINE CLINIC | Facility: CLINIC | Age: 39
End: 2019-12-24

## 2019-12-24 VITALS
BODY MASS INDEX: 27.77 KG/M2 | HEART RATE: 85 BPM | SYSTOLIC BLOOD PRESSURE: 118 MMHG | OXYGEN SATURATION: 99 % | DIASTOLIC BLOOD PRESSURE: 78 MMHG | HEIGHT: 70 IN | WEIGHT: 194 LBS

## 2019-12-24 DIAGNOSIS — S99.929A INJURY OF NAIL BED OF TOE: Primary | ICD-10-CM

## 2019-12-24 PROCEDURE — 11750 EXCISION NAIL&NAIL MATRIX: CPT | Performed by: FAMILY MEDICINE

## 2019-12-24 NOTE — PROGRESS NOTES
Subjective   Fidelia Avendaño is a 39 y.o. female who is here for   Chief Complaint   Patient presents with   • Toe Pain     rt foot big toe- bruised in september, puss came out from underneath toenail last night   .     History of Present Illness   Jammed her right great toe nail in her shoe months ago  More tender this week  Blood and pus came out from under the loose nail    The following portions of the patient's history were reviewed and updated as appropriate: allergies, current medications, past family history, past medical history, past social history, past surgical history and problem list.    Review of Systems    Objective     Physical Exam          Nail Removal  Date/Time: 12/24/2019 10:37 AM  Performed by: Antonio Ann MD  Authorized by: Antonio Ann MD   Consent: Verbal consent obtained.  Risks and benefits: risks, benefits and alternatives were discussed  Consent given by: patient  Patient understanding: patient states understanding of the procedure being performed  Patient identity confirmed: verbally with patient  Anesthesia: local infiltration    Anesthesia:  Local Anesthetic: lidocaine 1% without epinephrine  Anesthetic total: 2 mL  Preparation: skin prepped with alcohol  Amount removed: 1/2  Side: lateral  Wedge excision of skin of nail fold: no  Nail bed sutured: no  Nail matrix removed: partial  Removed nail replaced and anchored: no  Dressing: antibiotic ointment and gauze roll  Patient tolerance: Patient tolerated the procedure well with no immediate complications  Comments: Lifted nail plate off  Had a blood filled blister on nail bed, graves with scalpel point  Cut off loose tissue. Some oozing blood  All swabbed with iodine swab        Assessment/Plan   Fidelia was seen today for toe pain.    Diagnoses and all orders for this visit:    Injury of nail bed of toe    Other orders  -     Nail Removal      There are no Patient Instructions on file for this visit.    There are no  discontinued medications.     No follow-ups on file.    Dr. Antonio Ann  [place], [place]

## 2019-12-30 ENCOUNTER — HOSPITAL ENCOUNTER (OUTPATIENT)
Dept: GENERAL RADIOLOGY | Facility: HOSPITAL | Age: 39
Discharge: HOME | End: 2019-12-30
Admitting: FAMILY MEDICINE

## 2019-12-30 ENCOUNTER — TELEPHONE (OUTPATIENT)
Dept: FAMILY MEDICINE CLINIC | Facility: CLINIC | Age: 39
End: 2019-12-30

## 2019-12-30 DIAGNOSIS — S99.929A INJURY OF NAIL BED OF TOE: Primary | ICD-10-CM

## 2019-12-30 DIAGNOSIS — S99.929A INJURY OF NAIL BED OF TOE: ICD-10-CM

## 2019-12-30 PROCEDURE — 73660 X-RAY EXAM OF TOE(S): CPT

## 2019-12-30 RX ORDER — CEPHALEXIN 500 MG/1
500 CAPSULE ORAL 3 TIMES DAILY
Qty: 21 CAPSULE | Refills: 0 | Status: SHIPPED | OUTPATIENT
Start: 2019-12-30 | End: 2020-01-28

## 2019-12-30 NOTE — TELEPHONE ENCOUNTER
Pt called she said her toe is still swollen and now her foot is as well. She has been soaking it in epsom salt, anything else she can do or ntbs again?    I sent in keflex antibiotics for possible infection  Lets also xray the toe to make sure the bones look ok to rule out a occult fracture.    Antonio Ann MD

## 2020-01-14 DIAGNOSIS — F33.1 MODERATE EPISODE OF RECURRENT MAJOR DEPRESSIVE DISORDER (HCC): ICD-10-CM

## 2020-01-14 DIAGNOSIS — M79.7 FIBROMYALGIA: ICD-10-CM

## 2020-01-14 RX ORDER — MILNACIPRAN HYDROCHLORIDE 100 MG/1
TABLET, FILM COATED ORAL
Qty: 180 TABLET | Refills: 0 | Status: SHIPPED | OUTPATIENT
Start: 2020-01-14 | End: 2020-04-01

## 2020-01-14 RX ORDER — ESCITALOPRAM OXALATE 20 MG/1
TABLET ORAL
Qty: 90 TABLET | Refills: 0 | Status: SHIPPED | OUTPATIENT
Start: 2020-01-14 | End: 2020-04-01

## 2020-01-28 ENCOUNTER — OFFICE VISIT (OUTPATIENT)
Dept: FAMILY MEDICINE CLINIC | Facility: CLINIC | Age: 40
End: 2020-01-28

## 2020-01-28 VITALS
HEART RATE: 103 BPM | OXYGEN SATURATION: 99 % | WEIGHT: 198 LBS | HEIGHT: 71 IN | DIASTOLIC BLOOD PRESSURE: 80 MMHG | SYSTOLIC BLOOD PRESSURE: 130 MMHG | BODY MASS INDEX: 27.72 KG/M2

## 2020-01-28 DIAGNOSIS — R53.82 CHRONIC FATIGUE: Primary | ICD-10-CM

## 2020-01-28 DIAGNOSIS — M79.7 FIBROMYALGIA: ICD-10-CM

## 2020-01-28 PROBLEM — S99.929A INJURY OF NAIL BED OF TOE: Status: RESOLVED | Noted: 2019-12-24 | Resolved: 2020-01-28

## 2020-01-28 PROCEDURE — 99213 OFFICE O/P EST LOW 20 MIN: CPT | Performed by: FAMILY MEDICINE

## 2020-01-28 RX ORDER — METHYLPHENIDATE HYDROCHLORIDE 10 MG/1
10 TABLET ORAL 2 TIMES DAILY
Qty: 60 TABLET | Refills: 0 | Status: SHIPPED | OUTPATIENT
Start: 2020-01-28 | End: 2020-01-28 | Stop reason: SDUPTHER

## 2020-01-28 RX ORDER — METHYLPHENIDATE HYDROCHLORIDE 10 MG/1
10 TABLET ORAL 2 TIMES DAILY
Qty: 60 TABLET | Refills: 0 | Status: SHIPPED | OUTPATIENT
Start: 2020-01-28 | End: 2020-04-23 | Stop reason: SDUPTHER

## 2020-01-28 RX ORDER — CYCLOBENZAPRINE HCL 10 MG
10 TABLET ORAL NIGHTLY PRN
Qty: 30 TABLET | Refills: 5 | Status: SHIPPED | OUTPATIENT
Start: 2020-01-28 | End: 2020-05-13 | Stop reason: SDUPTHER

## 2020-01-28 NOTE — PROGRESS NOTES
Subjective   Fidelia Avendaño is a 39 y.o. female who is here for   Chief Complaint   Patient presents with   • Fatigue   .     History of Present Illness   Fatigue: Patient complains of fatigue. Symptoms began several years ago. Sentinal symptom the patient feels fatigue began with: exercise intolerance. Symptoms of her fatigue have been decreased libido, diffuse soft tissue aches and pains, fatigue with paradoxical insomnia, feelings of depression, general malaise, headaches, hypersomnolence, lack of interest in usual activities and poor athletic performance. Patient describes the following psychologic symptoms: none.  Patient denies fever, significant change in weight, unusual rashes, cold intolerance, constipation and change in hair texture., GI blood loss, excessive menstrual bleeding and witnessed or suspected sleep apnea. Symptoms have progressed to a point and plateaued. Severity has been struggles to carry out day to day responsibilities.. Previous visits for this problem: multiple, this is a longstanding diagnosis. Last seen 2 months ago by me   Ritalin provides some energy improvement  generalized prn tingling in hands and fingers  .       The following portions of the patient's history were reviewed and updated as appropriate: allergies, current medications, past family history, past medical history, past social history, past surgical history and problem list.    Review of Systems    Objective   Physical Exam   Constitutional: She appears well-developed and well-nourished. No distress.   Cardiovascular: Normal rate.   Pulmonary/Chest: Effort normal.   Neurological: She is alert.   Psychiatric: She has a normal mood and affect.   Nursing note and vitals reviewed.      Assessment/Plan   Fidelia was seen today for fatigue.    Diagnoses and all orders for this visit:    Chronic fatigue  -     Discontinue: methylphenidate (RITALIN) 10 MG tablet; Take 1 tablet by mouth 2 (Two) Times a Day.  -     methylphenidate  (RITALIN) 10 MG tablet; Take 1 tablet by mouth 2 (Two) Times a Day.    Fibromyalgia  -     cyclobenzaprine (FLEXERIL) 10 MG tablet; Take 1 tablet by mouth At Night As Needed for Muscle Spasms.      There are no Patient Instructions on file for this visit.    Medications Discontinued During This Encounter   Medication Reason   • cephalexin (KEFLEX) 500 MG capsule *Therapy completed   • methylphenidate (RITALIN) 10 MG tablet Reorder   • methylphenidate (RITALIN) 10 MG tablet Reorder   • cyclobenzaprine (FLEXERIL) 10 MG tablet Reorder        Return in about 4 months (around 5/28/2020).    Dr. Antonio Ann  [place], [place].

## 2020-02-28 ENCOUNTER — OFFICE VISIT (OUTPATIENT)
Dept: FAMILY MEDICINE CLINIC | Facility: CLINIC | Age: 40
End: 2020-02-28

## 2020-02-28 VITALS
SYSTOLIC BLOOD PRESSURE: 140 MMHG | DIASTOLIC BLOOD PRESSURE: 90 MMHG | HEART RATE: 100 BPM | TEMPERATURE: 98.7 F | RESPIRATION RATE: 16 BRPM | WEIGHT: 199 LBS | BODY MASS INDEX: 27.86 KG/M2 | HEIGHT: 71 IN | OXYGEN SATURATION: 98 %

## 2020-02-28 DIAGNOSIS — L30.9 DERMATITIS: Primary | ICD-10-CM

## 2020-02-28 PROCEDURE — 99213 OFFICE O/P EST LOW 20 MIN: CPT | Performed by: NURSE PRACTITIONER

## 2020-02-28 PROCEDURE — 96372 THER/PROPH/DIAG INJ SC/IM: CPT | Performed by: NURSE PRACTITIONER

## 2020-02-28 RX ORDER — METHYLPREDNISOLONE ACETATE 80 MG/ML
60 INJECTION, SUSPENSION INTRA-ARTICULAR; INTRALESIONAL; INTRAMUSCULAR; SOFT TISSUE ONCE
Status: COMPLETED | OUTPATIENT
Start: 2020-02-28 | End: 2020-02-28

## 2020-02-28 RX ORDER — PREDNISONE 20 MG/1
TABLET ORAL
Qty: 18 TABLET | Refills: 0 | Status: SHIPPED | OUTPATIENT
Start: 2020-02-28 | End: 2020-05-13

## 2020-02-28 RX ADMIN — METHYLPREDNISOLONE ACETATE 60 MG: 80 INJECTION, SUSPENSION INTRA-ARTICULAR; INTRALESIONAL; INTRAMUSCULAR; SOFT TISSUE at 09:04

## 2020-02-28 NOTE — PROGRESS NOTES
"Subjective    Chief Complaint   Patient presents with   • Rash     Left side       Fidelia Avendaño is a 39 y.o. female who presents for evaluation of a rash involving the chest. Rash started 4 days ago. Lesions are red, and raised in texture. Rash has changed over time. Rash is pruritic. Associated symptoms: none. Patient denies: abdominal pain, cough and fever. Patient has not had contacts with similar rash. Patient has had new exposures (soaps, lotions, laundry detergents, foods, medications, plants, insects or animals). New laundry detergent about 3 weeks ago.  Has tried Benadryl cream without relief.      The following portions of the patient's history were reviewed and updated as appropriate: allergies, current medications, past family history, past medical history, past social history, past surgical history and problem list.    Review of Systems  A comprehensive review of systems was negative except for: Integument/breast: positive for rash     Objective   /90 (BP Location: Right arm, Patient Position: Sitting, Cuff Size: Adult)   Pulse 100   Temp 98.7 °F (37.1 °C)   Resp 16   Ht 179.1 cm (70.5\")   Wt 90.3 kg (199 lb)   SpO2 98%   BMI 28.15 kg/m²   General:  alert, appears stated age and cooperative   Cardiac:  Regular rate  Pulmonary:  Respirations even and unlabored   Skin:  erythema noted on left side of chest, raised patchy rash to left side of chest under left breast and to left side of abdomen.  Small amount of blister erythema nodular rash spreading from patchy rash.  No drainage.      Assessment/Plan   atopic dermatitis    Possible fungal rash    Medications: Depomedrol IM, prednisone to be started tomorrow.  Nystatin/triamcinolone cream to area bid.  .  Verbal patient instruction given.  Follow up in 1 week.if no improvement.    Vale Romero, APRN       "

## 2020-02-28 NOTE — PATIENT INSTRUCTIONS
Atopic Dermatitis  Atopic dermatitis is a skin disorder that causes inflammation of the skin. This is the most common type of eczema. Eczema is a group of skin conditions that cause the skin to be itchy, red, and swollen. This condition is generally worse during the cooler winter months and often improves during the warm summer months. Symptoms can vary from person to person.  Atopic dermatitis usually starts showing signs in infancy and can last through adulthood. This condition cannot be passed from one person to another (non-contagious), but it is more common in families. Atopic dermatitis may not always be present. When it is present, it is called a flare-up.  What are the causes?  The exact cause of this condition is not known. Flare-ups of the condition may be triggered by:  · Contact with something that you are sensitive or allergic to.  · Stress.  · Certain foods.  · Extremely hot or cold weather.  · Harsh chemicals and soaps.  · Dry air.  · Chlorine.  What increases the risk?  This condition is more likely to develop in people who have a personal history or family history of eczema, allergies, asthma, or hay fever.  What are the signs or symptoms?  Symptoms of this condition include:  · Dry, scaly skin.  · Red, itchy rash.  · Itchiness, which can be severe. This may occur before the skin rash. This can make sleeping difficult.  · Skin thickening and cracking that can occur over time.  How is this diagnosed?  This condition is diagnosed based on your symptoms, a medical history, and a physical exam.  How is this treated?  There is no cure for this condition, but symptoms can usually be controlled. Treatment focuses on:  · Controlling the itchiness and scratching. You may be given medicines, such as antihistamines or steroid creams.  · Limiting exposure to things that you are sensitive or allergic to (allergens).  · Recognizing situations that cause stress and developing a plan to manage stress.  If your  atopic dermatitis does not get better with medicines, or if it is all over your body (widespread), a treatment using a specific type of light (phototherapy) may be used.  Follow these instructions at home:  Skin care    · Keep your skin well-moisturized. Doing this seals in moisture and helps to prevent dryness.  ? Use unscented lotions that have petroleum in them.  ? Avoid lotions that contain alcohol or water. They can dry the skin.  · Keep baths or showers short (less than 5 minutes) in warm water. Do not use hot water.  ? Use mild, unscented cleansers for bathing. Avoid soap and bubble bath.  ? Apply a moisturizer to your skin right after a bath or shower.  · Do not apply anything to your skin without checking with your health care provider.  General instructions  · Dress in clothes made of cotton or cotton blends. Dress lightly because heat increases itchiness.  · When washing your clothes, rinse your clothes twice so all of the soap is removed.  · Avoid any triggers that can cause a flare-up.  · Try to manage your stress.  · Keep your fingernails cut short.  · Avoid scratching. Scratching makes the rash and itchiness worse. It may also result in a skin infection (impetigo) due to a break in the skin caused by scratching.  · Take or apply over-the-counter and prescription medicines only as told by your health care provider.  · Keep all follow-up visits as told by your health care provider. This is important.  · Do not be around people who have cold sores or fever blisters. If you get the infection, it may cause your atopic dermatitis to worsen.  Contact a health care provider if:  · Your itchiness interferes with sleep.  · Your rash gets worse or it is not better within one week of starting treatment.  · You have a fever.  · You have a rash flare-up after having contact with someone who has cold sores or fever blisters.  Get help right away if:  · You develop pus or soft yellow scabs in the rash  area.  Summary  · This condition causes a red rash and itchy, dry, scaly skin.  · Treatment focuses on controlling the itchiness and scratching, limiting exposure to things that you are sensitive or allergic to (allergens), recognizing situations that cause stress, and developing a plan to manage stress.  · Keep your skin well-moisturized.  · Keep baths or showers shorter than 5 minutes and use warm water. Do not use hot water.  This information is not intended to replace advice given to you by your health care provider. Make sure you discuss any questions you have with your health care provider.  Document Released: 12/15/2001 Document Revised: 01/19/2018 Document Reviewed: 01/19/2018  [employer] Interactive Patient Education © 2020 [employer]

## 2020-04-01 DIAGNOSIS — F33.1 MODERATE EPISODE OF RECURRENT MAJOR DEPRESSIVE DISORDER (HCC): ICD-10-CM

## 2020-04-01 DIAGNOSIS — M79.7 FIBROMYALGIA: ICD-10-CM

## 2020-04-01 RX ORDER — ESCITALOPRAM OXALATE 20 MG/1
TABLET ORAL
Qty: 90 TABLET | Refills: 0 | Status: SHIPPED | OUTPATIENT
Start: 2020-04-01

## 2020-04-01 RX ORDER — MILNACIPRAN HYDROCHLORIDE 100 MG/1
TABLET, FILM COATED ORAL
Qty: 180 TABLET | Refills: 0 | Status: SHIPPED | OUTPATIENT
Start: 2020-04-01

## 2020-04-23 ENCOUNTER — TELEPHONE (OUTPATIENT)
Dept: FAMILY MEDICINE CLINIC | Facility: CLINIC | Age: 40
End: 2020-04-23

## 2020-04-23 DIAGNOSIS — R53.82 CHRONIC FATIGUE: ICD-10-CM

## 2020-04-23 RX ORDER — METHYLPHENIDATE HYDROCHLORIDE 10 MG/1
10 TABLET ORAL 2 TIMES DAILY
Qty: 60 TABLET | Refills: 0 | Status: SHIPPED | OUTPATIENT
Start: 2020-04-23 | End: 2020-04-23 | Stop reason: SDUPTHER

## 2020-04-23 RX ORDER — METHYLPHENIDATE HYDROCHLORIDE 10 MG/1
10 TABLET ORAL 2 TIMES DAILY
Qty: 60 TABLET | Refills: 0 | Status: SHIPPED | OUTPATIENT
Start: 2020-04-23 | End: 2020-05-13 | Stop reason: SDUPTHER

## 2020-04-23 NOTE — TELEPHONE ENCOUNTER
Pt called and requested refill for methylphenidate (RITALIN) 10 MG tablet be sent to    [id number] - [place], [place] - [address] AT [facility] [address] - [phone]  - [phone] FX     Call back  [phone]

## 2020-05-13 ENCOUNTER — OFFICE VISIT (OUTPATIENT)
Dept: FAMILY MEDICINE CLINIC | Facility: CLINIC | Age: 40
End: 2020-05-13

## 2020-05-13 VITALS
WEIGHT: 197 LBS | BODY MASS INDEX: 27.58 KG/M2 | OXYGEN SATURATION: 98 % | HEART RATE: 102 BPM | SYSTOLIC BLOOD PRESSURE: 120 MMHG | DIASTOLIC BLOOD PRESSURE: 84 MMHG | TEMPERATURE: 97.9 F | HEIGHT: 71 IN

## 2020-05-13 DIAGNOSIS — F33.0 MILD EPISODE OF RECURRENT MAJOR DEPRESSIVE DISORDER (HCC): ICD-10-CM

## 2020-05-13 DIAGNOSIS — M79.7 FIBROMYALGIA: ICD-10-CM

## 2020-05-13 DIAGNOSIS — R53.82 CHRONIC FATIGUE: Primary | ICD-10-CM

## 2020-05-13 PROCEDURE — 99213 OFFICE O/P EST LOW 20 MIN: CPT | Performed by: FAMILY MEDICINE

## 2020-05-13 RX ORDER — METHYLPHENIDATE HYDROCHLORIDE 10 MG/1
10 TABLET ORAL 2 TIMES DAILY
Qty: 60 TABLET | Refills: 0 | Status: SHIPPED | OUTPATIENT
Start: 2020-05-13

## 2020-05-13 RX ORDER — CYCLOBENZAPRINE HCL 10 MG
10 TABLET ORAL NIGHTLY PRN
Qty: 90 TABLET | Refills: 1 | Status: SHIPPED | OUTPATIENT
Start: 2020-05-13

## 2020-05-13 RX ORDER — METHYLPHENIDATE HYDROCHLORIDE 10 MG/1
10 TABLET ORAL 2 TIMES DAILY
Qty: 60 TABLET | Refills: 0 | Status: SHIPPED | OUTPATIENT
Start: 2020-05-13 | End: 2020-05-13 | Stop reason: SDUPTHER

## 2020-05-13 NOTE — PROGRESS NOTES
Subjective   Fidelia Avendaño is a 39 y.o. female who is here for   Chief Complaint   Patient presents with   • Fatigue   • Fibromyalgia   .     History of Present Illness   fibromyalgia is stable  Chronic fatigue is stable  They are all moving to [place] next month due to husbands new [facility] assignment.      The following portions of the patient's history were reviewed and updated as appropriate: allergies, current medications, past family history, past medical history, past social history, past surgical history and problem list.    Review of Systems   Constitutional: Positive for fatigue. Negative for fever.   HENT: Negative.    Respiratory: Negative.    Cardiovascular: Negative.    Musculoskeletal: Positive for arthralgias, back pain, myalgias, neck pain and neck stiffness.   Skin: Negative for rash.   Psychiatric/Behavioral: Positive for decreased concentration.       Objective   Physical Exam   Constitutional: She appears well-developed and well-nourished. No distress.   Cardiovascular: Normal rate.   Pulmonary/Chest: Effort normal.   Musculoskeletal: She exhibits tenderness. She exhibits no edema.   Neurological: She is alert.   Psychiatric: She has a normal mood and affect.   Nursing note and vitals reviewed.      Assessment/Plan   Fidelia was seen today for fatigue and fibromyalgia.    Diagnoses and all orders for this visit:    Chronic fatigue  -     Discontinue: methylphenidate (Ritalin) 10 MG tablet; Take 1 tablet by mouth 2 (Two) Times a Day.  -     methylphenidate (Ritalin) 10 MG tablet; Take 1 tablet by mouth 2 (Two) Times a Day.    Fibromyalgia  -     cyclobenzaprine (FLEXERIL) 10 MG tablet; Take 1 tablet by mouth At Night As Needed for Muscle Spasms.    Mild episode of recurrent major depressive disorder (CMS/HCC)    i printed out 2 ritalin scripts  One for [facility] and #2 for [place].    There are no Patient Instructions on file for this visit.    Medications Discontinued During This Encounter    Medication Reason   • nystatin-triamcinolone (MYCOLOG II) 834221-7.1 UNIT/GM-% cream *Therapy completed   • predniSONE (DELTASONE) 20 MG tablet *Therapy completed   • cyclobenzaprine (FLEXERIL) 10 MG tablet Reorder   • methylphenidate (Ritalin) 10 MG tablet Reorder   • methylphenidate (Ritalin) 10 MG tablet Reorder        Return if symptoms worsen or fail to improve.    Dr. Antonio Ann  [place], [place]

## 2022-06-19 NOTE — PROGRESS NOTES
Subjective   Fidelia Avendaño is a 38 y.o. female who is here for   Chief Complaint   Patient presents with   • Follow-up   • Fibromyalgia   .     History of Present Illness   F/u on Savella. We increased to 100 mg last ov.    Fibromyalgia: Patient here for follow up on fibromyalgia. Patient has a several years history of fatigue: moderate, tolerable, overall course: symptoms have progressed to a point and plateaued: course over time: symptoms have progressed to a point and plateaued..  Onset was gradual. The symptoms are of moderate severity. They are made worse by: overhead work. They are helped by rest. The patient denies diagnosis is of longstanding, no ROS questions asked. Previous treatments include OTC meds and prescription meds - see below. Associated symptoms include fatigue, morning stiffness, muscle weakness and new headache. Patient denies associated bleeding/clotting problems, fevers and oral ulcers. Evaluation to date includes diagnosis is of longstanding, no recent workup.. Recent interventions include savella up to 100 mg bid.  Limitation on activities include difficulty with ADLs - ,. Patient struggles as a homemaker.      The following portions of the patient's history were reviewed and updated as appropriate: allergies, current medications, past family history, past medical history, past social history and problem list.    Review of Systems    Objective   Physical Exam   Constitutional: She appears well-developed and well-nourished.   Cardiovascular: Normal rate.    Pulmonary/Chest: Effort normal.   Neurological: She is alert.   Nursing note and vitals reviewed.      Assessment/Plan   Fidelia was seen today for follow-up and fibromyalgia.    Diagnoses and all orders for this visit:    Fibromyalgia    stay on savella 100 bid.  Lexapro working well   There are no Patient Instructions on file for this visit.    There are no discontinued medications.     Return in about 6 months (around 2/7/2019).      Antonio Ann  [facility] [place], [place]   [phone]

## 2024-10-15 ENCOUNTER — NEW PATIENT (OUTPATIENT)
Dept: URBAN - METROPOLITAN AREA CLINIC 11 | Facility: CLINIC | Age: 44
End: 2024-10-15

## 2024-10-15 DIAGNOSIS — H35.3131: ICD-10-CM

## 2024-10-15 DIAGNOSIS — H35.53: ICD-10-CM

## 2024-10-15 DIAGNOSIS — H25.13: ICD-10-CM

## 2024-10-15 PROCEDURE — 92201 OPSCPY EXTND RTA DRAW UNI/BI: CPT

## 2024-10-15 PROCEDURE — 92004 COMPRE OPH EXAM NEW PT 1/>: CPT

## 2024-10-15 PROCEDURE — 92134 CPTRZ OPH DX IMG PST SGM RTA: CPT
